# Patient Record
Sex: MALE | Race: BLACK OR AFRICAN AMERICAN | NOT HISPANIC OR LATINO | Employment: FULL TIME | URBAN - METROPOLITAN AREA
[De-identification: names, ages, dates, MRNs, and addresses within clinical notes are randomized per-mention and may not be internally consistent; named-entity substitution may affect disease eponyms.]

---

## 2023-10-04 DIAGNOSIS — R60.0 LOCALIZED EDEMA: Primary | ICD-10-CM

## 2023-10-04 RX ORDER — FUROSEMIDE 40 MG/1
40 TABLET ORAL DAILY
Qty: 30 TABLET | Refills: 0 | Status: SHIPPED | OUTPATIENT
Start: 2023-10-04 | End: 2023-11-13 | Stop reason: SDUPTHER

## 2023-10-04 RX ORDER — DULAGLUTIDE 3 MG/.5ML
3 INJECTION, SOLUTION SUBCUTANEOUS
COMMUNITY
Start: 2021-07-07 | End: 2023-11-01 | Stop reason: SDUPTHER

## 2023-10-04 RX ORDER — FUROSEMIDE 40 MG/1
40 TABLET ORAL DAILY
COMMUNITY
End: 2023-10-04 | Stop reason: SDUPTHER

## 2023-10-05 DIAGNOSIS — Z79.4 TYPE 2 DIABETES MELLITUS WITH HYPERGLYCEMIA, WITH LONG-TERM CURRENT USE OF INSULIN (MULTI): Primary | ICD-10-CM

## 2023-10-05 DIAGNOSIS — E11.65 TYPE 2 DIABETES MELLITUS WITH HYPERGLYCEMIA, WITH LONG-TERM CURRENT USE OF INSULIN (MULTI): Primary | ICD-10-CM

## 2023-10-05 RX ORDER — LANCETS 33 GAUGE
1 EACH MISCELLANEOUS 4 TIMES DAILY
COMMUNITY
Start: 2021-07-07 | End: 2023-11-01 | Stop reason: SDUPTHER

## 2023-10-05 RX ORDER — HUMAN INSULIN 100 [IU]/ML
30 INJECTION, SUSPENSION SUBCUTANEOUS
COMMUNITY
Start: 2021-07-07 | End: 2023-11-01 | Stop reason: ALTCHOICE

## 2023-10-05 RX ORDER — INSULIN PUMP SYRINGE, 3 ML
1 EACH MISCELLANEOUS 4 TIMES DAILY
COMMUNITY
Start: 2021-01-06 | End: 2023-11-01 | Stop reason: SDUPTHER

## 2023-10-05 RX ORDER — CALCIUM CITRATE/VITAMIN D3 200MG-6.25
1 TABLET ORAL 4 TIMES DAILY
COMMUNITY
Start: 2021-01-29 | End: 2023-11-01 | Stop reason: SDUPTHER

## 2023-10-31 PROBLEM — H52.4 MYOPIA OF BOTH EYES WITH REGULAR ASTIGMATISM AND PRESBYOPIA: Status: ACTIVE | Noted: 2023-10-31

## 2023-10-31 PROBLEM — E11.9 DIABETES MELLITUS, TYPE 2 (MULTI): Status: ACTIVE | Noted: 2023-10-31

## 2023-10-31 PROBLEM — H52.223 MYOPIA OF BOTH EYES WITH REGULAR ASTIGMATISM AND PRESBYOPIA: Status: ACTIVE | Noted: 2023-10-31

## 2023-10-31 PROBLEM — M25.819 SHOULDER IMPINGEMENT: Status: ACTIVE | Noted: 2023-10-31

## 2023-10-31 PROBLEM — R05.3 PERSISTENT COUGH: Status: ACTIVE | Noted: 2023-10-31

## 2023-10-31 PROBLEM — M75.82 TENDINITIS OF LEFT ROTATOR CUFF: Status: ACTIVE | Noted: 2023-10-31

## 2023-10-31 PROBLEM — U07.1 COVID-19 VIRUS INFECTION: Status: ACTIVE | Noted: 2023-10-31

## 2023-10-31 PROBLEM — H18.602: Status: ACTIVE | Noted: 2023-10-31

## 2023-10-31 PROBLEM — E11.3391: Status: ACTIVE | Noted: 2023-10-31

## 2023-10-31 PROBLEM — M72.0 DUPUYTRENS CONTRACTURE: Status: ACTIVE | Noted: 2023-10-31

## 2023-10-31 PROBLEM — M75.02 ADHESIVE CAPSULITIS OF LEFT SHOULDER: Status: ACTIVE | Noted: 2023-10-31

## 2023-10-31 PROBLEM — S43.439A LABRAL TEAR OF SHOULDER: Status: ACTIVE | Noted: 2023-10-31

## 2023-10-31 PROBLEM — H52.13 MYOPIA OF BOTH EYES WITH REGULAR ASTIGMATISM AND PRESBYOPIA: Status: ACTIVE | Noted: 2023-10-31

## 2023-10-31 PROBLEM — U07.1 COVID-19 VIRUS INFECTION: Status: RESOLVED | Noted: 2023-10-31 | Resolved: 2023-10-31

## 2023-10-31 PROBLEM — S46.212D: Status: ACTIVE | Noted: 2023-10-31

## 2023-10-31 PROBLEM — E11.3312: Status: ACTIVE | Noted: 2023-10-31

## 2023-10-31 PROBLEM — Z79.4 LONG TERM (CURRENT) USE OF INSULIN (MULTI): Status: ACTIVE | Noted: 2023-10-31

## 2023-10-31 PROBLEM — E66.01 MORBID OBESITY (MULTI): Status: ACTIVE | Noted: 2023-10-31

## 2023-10-31 PROBLEM — H52.13 MYOPIA OF BOTH EYES: Status: ACTIVE | Noted: 2023-10-31

## 2023-10-31 PROBLEM — R60.0 LOWER EXTREMITY EDEMA: Status: ACTIVE | Noted: 2023-10-31

## 2023-10-31 PROBLEM — I10 BENIGN ESSENTIAL HYPERTENSION: Status: ACTIVE | Noted: 2023-10-31

## 2023-10-31 PROBLEM — M25.512 LEFT SHOULDER PAIN: Status: ACTIVE | Noted: 2023-10-31

## 2023-10-31 PROBLEM — M75.81 RIGHT ROTATOR CUFF TENDINITIS: Status: ACTIVE | Noted: 2023-10-31

## 2023-10-31 RX ORDER — TIRZEPATIDE 10 MG/.5ML
10 INJECTION, SOLUTION SUBCUTANEOUS
COMMUNITY
End: 2023-11-01 | Stop reason: ALTCHOICE

## 2023-10-31 RX ORDER — ACETAMINOPHEN 500 MG
2 TABLET ORAL EVERY 6 HOURS PRN
COMMUNITY
Start: 2023-06-14

## 2023-10-31 RX ORDER — IBUPROFEN 600 MG/1
600 TABLET ORAL EVERY 6 HOURS PRN
COMMUNITY
Start: 2023-06-14 | End: 2023-11-01 | Stop reason: ALTCHOICE

## 2023-10-31 RX ORDER — LISINOPRIL 10 MG/1
10 TABLET ORAL DAILY
COMMUNITY
Start: 2023-01-04

## 2023-11-01 ENCOUNTER — OFFICE VISIT (OUTPATIENT)
Dept: ENDOCRINOLOGY | Facility: CLINIC | Age: 43
End: 2023-11-01
Payer: COMMERCIAL

## 2023-11-01 VITALS
BODY MASS INDEX: 45.1 KG/M2 | WEIGHT: 315 LBS | HEIGHT: 70 IN | DIASTOLIC BLOOD PRESSURE: 98 MMHG | HEART RATE: 90 BPM | SYSTOLIC BLOOD PRESSURE: 154 MMHG

## 2023-11-01 DIAGNOSIS — Z79.4 TYPE 2 DIABETES MELLITUS WITH HYPERGLYCEMIA, WITH LONG-TERM CURRENT USE OF INSULIN (MULTI): Primary | ICD-10-CM

## 2023-11-01 DIAGNOSIS — E11.65 TYPE 2 DIABETES MELLITUS WITH HYPERGLYCEMIA, WITH LONG-TERM CURRENT USE OF INSULIN (MULTI): Primary | ICD-10-CM

## 2023-11-01 DIAGNOSIS — E11.9 TYPE 2 DIABETES MELLITUS WITHOUT COMPLICATION, WITHOUT LONG-TERM CURRENT USE OF INSULIN (MULTI): ICD-10-CM

## 2023-11-01 LAB — POC HEMOGLOBIN A1C: 9.6 % (ref 4.2–6.5)

## 2023-11-01 PROCEDURE — 3077F SYST BP >= 140 MM HG: CPT | Performed by: PHYSICIAN ASSISTANT

## 2023-11-01 PROCEDURE — 99214 OFFICE O/P EST MOD 30 MIN: CPT | Performed by: PHYSICIAN ASSISTANT

## 2023-11-01 PROCEDURE — 3052F HG A1C>EQUAL 8.0%<EQUAL 9.0%: CPT | Performed by: PHYSICIAN ASSISTANT

## 2023-11-01 PROCEDURE — 4010F ACE/ARB THERAPY RXD/TAKEN: CPT | Performed by: PHYSICIAN ASSISTANT

## 2023-11-01 PROCEDURE — 83036 HEMOGLOBIN GLYCOSYLATED A1C: CPT | Performed by: PHYSICIAN ASSISTANT

## 2023-11-01 PROCEDURE — 3080F DIAST BP >= 90 MM HG: CPT | Performed by: PHYSICIAN ASSISTANT

## 2023-11-01 RX ORDER — INSULIN PUMP SYRINGE, 3 ML
1 EACH MISCELLANEOUS 4 TIMES DAILY
Qty: 1 EACH | Refills: 11 | Status: SHIPPED | OUTPATIENT
Start: 2023-11-01

## 2023-11-01 RX ORDER — CALCIUM CITRATE/VITAMIN D3 200MG-6.25
1 TABLET ORAL 4 TIMES DAILY
Qty: 400 EACH | Refills: 3 | Status: SHIPPED | OUTPATIENT
Start: 2023-11-01

## 2023-11-01 RX ORDER — ISOPROPYL ALCOHOL 70 ML/100ML
1 SWAB TOPICAL
Qty: 400 EACH | Refills: 3 | Status: SHIPPED | OUTPATIENT
Start: 2023-11-01 | End: 2024-10-31

## 2023-11-01 RX ORDER — LANCETS 33 GAUGE
1 EACH MISCELLANEOUS 4 TIMES DAILY
Qty: 30 EACH | Refills: 3 | Status: SHIPPED | OUTPATIENT
Start: 2023-11-01

## 2023-11-01 RX ORDER — DULAGLUTIDE 3 MG/.5ML
3 INJECTION, SOLUTION SUBCUTANEOUS
Qty: 2 ML | Refills: 11 | Status: SHIPPED | OUTPATIENT
Start: 2023-11-01

## 2023-11-01 NOTE — PROGRESS NOTES
"Subjective   Dallas Murray is a 43 y.o. male who presents for follow-up of Type 2 diabetes mellitus. The initial diagnosis of diabetes was made in January 2021 . The patient does have a known family history of diabetes.    Known complications due to diabetes included obesity    Cardiovascular risk factors include diabetes mellitus, hypertension, male gender, and obesity (BMI >= 30 kg/m2). The patient is on an ACE inhibitor or angiotensin II receptor blocker.  The patient has not been previously hospitalized due to diabetic ketoacidosis.     Current symptoms/problems include hyperglycemia. His clinical course has worsened as indicated by rise in A1C .     Current diabetes regimen is as follows:  invokamet (not taking) and trulicity 3mg      The patient is currently checking the blood glucose 2+ times per day.    Patient is using: glucometer    Hypoglycemia frequency: n/a  Hypoglycemia awareness: Yes     Exercise: rarely   Meal panning: He is using avoidance of concentrated sweets.    Review of Systems   Psychiatric/Behavioral:          Depressed mood, will give up on diet/medications when he is unable to get meds from pharmacy   All other systems reviewed and are negative.      Objective   BP (!) 154/98   Pulse 90   Ht 1.778 m (5' 10\")   Wt (!) 194 kg (428 lb)   BMI 61.41 kg/m²   Physical Exam  Constitutional:       Appearance: Normal appearance. He is obese.   HENT:      Head: Normocephalic.      Mouth/Throat:      Mouth: Mucous membranes are moist.      Pharynx: Oropharynx is clear.   Cardiovascular:      Rate and Rhythm: Normal rate and regular rhythm.   Pulmonary:      Effort: Pulmonary effort is normal.      Breath sounds: Normal breath sounds.   Musculoskeletal:         General: Swelling present.   Skin:     General: Skin is warm and dry.   Neurological:      General: No focal deficit present.      Mental Status: He is alert and oriented to person, place, and time. Mental status is at baseline. "   Psychiatric:         Mood and Affect: Mood normal.         Behavior: Behavior normal.         Thought Content: Thought content normal.         Judgment: Judgment normal.         Lab Review  Glucose (mg/dL)   Date Value   01/04/2023 177 (H)   07/07/2021 177 (H)   01/03/2021 363 (H)     POC HEMOGLOBIN A1c (%)   Date Value   11/01/2023 9.6 (A)     Hemoglobin A1C (%)   Date Value   01/04/2023 8.2 (A)   07/07/2021 7.8   01/02/2021 12.7     Bicarbonate (mmol/L)   Date Value   01/04/2023 28   07/07/2021 30   01/03/2021 24     Urea Nitrogen (mg/dL)   Date Value   01/04/2023 14   07/07/2021 17   01/03/2021 17     Creatinine (mg/dL)   Date Value   01/04/2023 0.97   07/07/2021 1.02   01/03/2021 0.73       Health Maintenance:   Foot Exam: podiatry seen in 6/2023  Eye Exam: scheduled to see optometry on 11/8/23  Lipid Panel: due for update, ordered today  Urine Albumin: due for update, ordered today    Assessment/Plan   Diagnoses and all orders for this visit:  Type 2 diabetes mellitus without complication, without long-term current use of insulin (CMS/[facility])  -     POCT glycosylated hemoglobin (Hb A1C) manually resulted  Type 2 diabetes mellitus with hyperglycemia, with long-term current use of insulin (CMS/[facility])  -     empagliflozin-metformin (Synjardy) 12.5-1,000 mg; Take 1 tablet by mouth 2 times a day.  -     dulaglutide (Trulicity) 3 mg/0.5 mL pen injector; Inject 3 mg under the skin 1 (one) time per week.      Type 2 diabetes mellitus, is not at goal.    RX changes:  restart Trulicity 3mg once a week, switch invokamet to synjardy 12.5-1000mg twice a day with food,     Education:  interpretation of lab results, blood sugar goals, and nutrition  Follow up: I recommend diabetes care be 3 months.

## 2023-11-08 ENCOUNTER — APPOINTMENT (OUTPATIENT)
Dept: OPHTHALMOLOGY | Facility: CLINIC | Age: 43
End: 2023-11-08
Payer: COMMERCIAL

## 2023-11-13 ENCOUNTER — TELEPHONE (OUTPATIENT)
Dept: ENDOCRINOLOGY | Facility: HOSPITAL | Age: 43
End: 2023-11-13
Payer: COMMERCIAL

## 2023-11-13 DIAGNOSIS — R60.0 LOCALIZED EDEMA: ICD-10-CM

## 2023-11-14 RX ORDER — FUROSEMIDE 40 MG/1
40 TABLET ORAL DAILY
Qty: 30 TABLET | Refills: 0 | Status: SHIPPED | OUTPATIENT
Start: 2023-11-14

## 2024-02-14 ENCOUNTER — APPOINTMENT (OUTPATIENT)
Dept: OPHTHALMOLOGY | Facility: CLINIC | Age: 44
End: 2024-02-14
Payer: COMMERCIAL

## 2024-05-15 ENCOUNTER — APPOINTMENT (OUTPATIENT)
Dept: OPHTHALMOLOGY | Facility: CLINIC | Age: 44
End: 2024-05-15
Payer: COMMERCIAL

## 2024-06-12 ENCOUNTER — APPOINTMENT (OUTPATIENT)
Dept: OPHTHALMOLOGY | Facility: CLINIC | Age: 44
End: 2024-06-12
Payer: COMMERCIAL

## 2024-09-24 ENCOUNTER — APPOINTMENT (OUTPATIENT)
Dept: ENDOCRINOLOGY | Facility: CLINIC | Age: 44
End: 2024-09-24
Payer: COMMERCIAL

## 2024-09-24 ENCOUNTER — OFFICE VISIT (OUTPATIENT)
Dept: ENDOCRINOLOGY | Facility: CLINIC | Age: 44
End: 2024-09-24
Payer: COMMERCIAL

## 2024-09-24 VITALS
HEIGHT: 70 IN | HEART RATE: 87 BPM | SYSTOLIC BLOOD PRESSURE: 149 MMHG | TEMPERATURE: 97.2 F | WEIGHT: 232.3 LBS | BODY MASS INDEX: 33.26 KG/M2 | DIASTOLIC BLOOD PRESSURE: 86 MMHG

## 2024-09-24 DIAGNOSIS — E11.9 TYPE 2 DIABETES MELLITUS WITHOUT COMPLICATION, WITHOUT LONG-TERM CURRENT USE OF INSULIN (MULTI): ICD-10-CM

## 2024-09-24 DIAGNOSIS — Z79.4 TYPE 2 DIABETES MELLITUS WITH HYPERGLYCEMIA, WITH LONG-TERM CURRENT USE OF INSULIN: ICD-10-CM

## 2024-09-24 DIAGNOSIS — E11.65 TYPE 2 DIABETES MELLITUS WITH HYPERGLYCEMIA, WITH LONG-TERM CURRENT USE OF INSULIN: ICD-10-CM

## 2024-09-24 LAB — POC HEMOGLOBIN A1C: 12.4 % (ref 4.2–6.5)

## 2024-09-24 PROCEDURE — 4010F ACE/ARB THERAPY RXD/TAKEN: CPT | Performed by: STUDENT IN AN ORGANIZED HEALTH CARE EDUCATION/TRAINING PROGRAM

## 2024-09-24 PROCEDURE — 99215 OFFICE O/P EST HI 40 MIN: CPT | Performed by: STUDENT IN AN ORGANIZED HEALTH CARE EDUCATION/TRAINING PROGRAM

## 2024-09-24 PROCEDURE — 83036 HEMOGLOBIN GLYCOSYLATED A1C: CPT | Performed by: STUDENT IN AN ORGANIZED HEALTH CARE EDUCATION/TRAINING PROGRAM

## 2024-09-24 PROCEDURE — 3077F SYST BP >= 140 MM HG: CPT | Performed by: STUDENT IN AN ORGANIZED HEALTH CARE EDUCATION/TRAINING PROGRAM

## 2024-09-24 PROCEDURE — 1036F TOBACCO NON-USER: CPT | Performed by: STUDENT IN AN ORGANIZED HEALTH CARE EDUCATION/TRAINING PROGRAM

## 2024-09-24 PROCEDURE — 3008F BODY MASS INDEX DOCD: CPT | Performed by: STUDENT IN AN ORGANIZED HEALTH CARE EDUCATION/TRAINING PROGRAM

## 2024-09-24 PROCEDURE — 3079F DIAST BP 80-89 MM HG: CPT | Performed by: STUDENT IN AN ORGANIZED HEALTH CARE EDUCATION/TRAINING PROGRAM

## 2024-09-24 RX ORDER — INSULIN GLARGINE 300 [IU]/ML
INJECTION, SOLUTION SUBCUTANEOUS
Qty: 3 ML | Refills: 5 | Status: SHIPPED | OUTPATIENT
Start: 2024-09-24

## 2024-09-24 RX ORDER — LANCETS 33 GAUGE
EACH MISCELLANEOUS
Qty: 200 EACH | Refills: 3 | Status: SHIPPED | OUTPATIENT
Start: 2024-09-24

## 2024-09-24 RX ORDER — PEN NEEDLE, DIABETIC 30 GX3/16"
NEEDLE, DISPOSABLE MISCELLANEOUS
Qty: 100 EACH | Refills: 3 | Status: SHIPPED | OUTPATIENT
Start: 2024-09-24

## 2024-09-24 RX ORDER — ISOPROPYL ALCOHOL 70 ML/100ML
SWAB TOPICAL
Qty: 400 EACH | Refills: 3 | Status: SHIPPED | OUTPATIENT
Start: 2024-09-24

## 2024-09-24 RX ORDER — CYCLOBENZAPRINE HCL 10 MG
TABLET ORAL
COMMUNITY
Start: 2023-10-17

## 2024-09-24 RX ORDER — SEMAGLUTIDE 1.34 MG/ML
1 INJECTION, SOLUTION SUBCUTANEOUS
Qty: 3 ML | Refills: 0 | Status: SHIPPED | OUTPATIENT
Start: 2024-09-29

## 2024-09-24 RX ORDER — SEMAGLUTIDE 2.68 MG/ML
2 INJECTION, SOLUTION SUBCUTANEOUS
Qty: 3 ML | Refills: 5 | Status: SHIPPED | OUTPATIENT
Start: 2024-09-24

## 2024-09-24 RX ORDER — CALCIUM CITRATE/VITAMIN D3 200MG-6.25
1 TABLET ORAL 4 TIMES DAILY
Qty: 400 EACH | Refills: 3 | Status: SHIPPED | OUTPATIENT
Start: 2024-09-24

## 2024-09-24 RX ORDER — SEMAGLUTIDE 0.68 MG/ML
0.5 INJECTION, SOLUTION SUBCUTANEOUS
Qty: 3 ML | Refills: 0 | Status: SHIPPED | OUTPATIENT
Start: 2024-09-24

## 2024-09-24 NOTE — PATIENT INSTRUCTIONS
Start Toujeo 20 units daily at bedtime  Start Ozempic 0.5 mg, once a week.   After 4 weeks, increase to 1 mg, once a week  After another 4 weeks, increase to 2 mg, once a week  Continue Synjardy  Check blood glucose twice a day (fasting and 2 hours after a meal)

## 2024-09-24 NOTE — PROGRESS NOTES
FUV for diabetes. LV with Tonia Adams PA-C 11/2023.    Subjective   Dallas Murray is a 43 y.o. male with a hx of type 2 diabetes, HTN, HLD, obesity, who presents for management of diabetes.    Dx: 01/2021  HbA1c: 12.4% (9/24/2024), 9.6% (11/2023)  Current regimen: (Synjardy 12.5-1000 mg BID, Trulicity 3 mg/wee)  Past medications: invokamet, Lantus (skin reaction)  Complications:    SMBG: no    Hypoglycemia awareness:    Diet: mostly twice a day   Breakfast and lunch    Social Hx: works in [employer]; goes to work very early in the morning (3:30 am) and finishes between 2:30-4 pm.    ROS  General: no fever or chills  CV: no chest pain   Respiratory: no shortness of breath  MSK: no lower extremity edema  Neuro: no headache or dizziness  See HPI for Endocrine ROS    No past medical history on file.    No past surgical history on file.    Social History     Socioeconomic History    Marital status: Single     Spouse name: Not on file    Number of children: Not on file    Years of education: Not on file    Highest education level: Not on file   Occupational History    Not on file   Tobacco Use    Smoking status: Never     Passive exposure: Past    Smokeless tobacco: Never   Substance and Sexual Activity    Alcohol use: Not on file    Drug use: Not on file    Sexual activity: Not on file   Other Topics Concern    Not on file   Social History Narrative    Not on file     Social Determinants of Health     Financial Resource Strain: Not on File (8/25/2019)    Received from [facility]    Financial Resource Strain     Financial Resource Strain: 0   Food Insecurity: Not on File (8/25/2019)    Received from [facility]    Food Insecurity     Food: 0   Transportation Needs: Not on File (8/25/2019)    Received from [facility]    Transportation Needs     Transportation: 0   Physical Activity: Not on File (8/25/2019)    Received from [facility]    Physical Activity     Physical Activity: 0   Stress: Not on File  "(2019)    Received from [facility]    Stress     Stress: 0   Social Connections: Not on File (2019)    Received from [facility]    Social Connections     Social Connections and Isolation: 0   Intimate Partner Violence: Not on file   Housing Stability: Not on File (2019)    Received from [facility]    Housing Stability     Housin       Objective    Physical Exam  Blood pressure 149/86, pulse 87, temperature 36.2 °C (97.2 °F), temperature source Temporal, height 1.778 m (5' 10\"), weight 105 kg (232 lb 4.8 oz).  General: not in acute distress  HEENT: KOREY, EOMI  Thyroid: no goiter  Neuro: alert and oriented x 3      Current Outpatient Medications:     acetaminophen (Tylenol) 500 mg tablet, Take 2 tablets (1,000 mg) by mouth every 6 hours if needed (FOR UP TO 14 DAYS AS NEEDED FOR PAIN)., Disp: , Rfl:     alcohol swabs (Alcohol Prep Pads) pads, medicated, Apply 1 each topically in the morning, at noon, in the evening, and at bedtime. USE AS DIRECTED 4 times daily and PRN, Disp: 400 each, Rfl: 3    cyclobenzaprine (Flexeril) 10 mg tablet, TAKE 1 TABLET BY MOUTH EVERY 8 HOURS FOR UP TO 5 DAYS AS NEEDED, Disp: , Rfl:     empagliflozin-metformin (Synjardy) 12.5-1,000 mg, Take 1 tablet by mouth 2 times a day., Disp: 180 tablet, Rfl: 3    furosemide (Lasix) 40 mg tablet, Take 1 tablet (40 mg) by mouth once daily. as directed, Disp: 30 tablet, Rfl: 0    lancets 33 gauge misc, Inject 1 each under the skin 4 times a day., Disp: 30 each, Rfl: 3    lisinopril 10 mg tablet, Take 1 tablet (10 mg) by mouth once daily., Disp: , Rfl:     blood sugar diagnostic (True Metrix Glucose Test Strip) strip, Inject 1 each under the skin 4 times a day., Disp: 400 each, Rfl: 3    dulaglutide (Trulicity) 3 mg/0.5 mL pen injector, Inject 3 mg under the skin 1 (one) time per week. (Patient not taking: Reported on 2024), Disp: 2 mL, Rfl: 11    FreeStyle glucose monitoring (Blood Glucose Monitoring) kit, Inject 1 each " under the skin 4 times a day., Disp: 1 each, Rfl: 11    Assessment/Plan   Dallas Murray is a 43 y.o. male with a hx of type 2 diabetes, HTN, obesity, who presents for management of diabetes.    Type 2 diabetes mellitus  HbA1c: 12.4% (9/24/2024), 9.6% (11/2023), 8.2% (01/2023)  Current regimen: (Synjardy 12.5-1000 mg BID, Trulicity 3 mg/week)  Eye exam: due  Urine microalbumin: 302 micrograms/mg (01/2023) on ACE-I and SGLT2i  Podiatry: due  Lipids: HDL 43, , TG 88 (01/2023)    A1c: 12.4% today  Has been off of all diabetes medications since losing insurance.  Has not been checking blood sugars.  He previously had local skin irritation with long-acting insulin (Lantus and Tresiba).  Given the significantly elevated A1c, I told him he needs insulin at this time to break the glucotoxicity.   I recommend starting long-acting insulin, Ozempic, and resuming Synjardy.  Advised that he check BG twice a day.  He is reluctant to take insulin, but he is agreeable to trying.  Will titrate up Ozempic every 4 weeks to the 2 mg dose.  Will refer to clinical pharmacy for help with medication adjustment.    Did not have time to discuss screening or CGM today.  Will discuss at next visit.    PLAN:  -start Toujeo 20 units at bedtime  -start Ozempic 0.5 mg/week, increase every 4 weeks to 2 mg/week  -resume Synjardy 12.5-1000 mg BID  -check BG twice daily (fasting and 2h post meal)  -to discuss screening for complications at next visit  -refer to clinical pharmacy for medication adjustment    Follow-up in 4 months

## 2024-10-07 DIAGNOSIS — Z79.4 TYPE 2 DIABETES MELLITUS WITH HYPERGLYCEMIA, WITH LONG-TERM CURRENT USE OF INSULIN: ICD-10-CM

## 2024-10-07 DIAGNOSIS — E11.65 TYPE 2 DIABETES MELLITUS WITH HYPERGLYCEMIA, WITH LONG-TERM CURRENT USE OF INSULIN: ICD-10-CM

## 2024-10-14 RX ORDER — INSULIN GLARGINE 100 [IU]/ML
INJECTION, SOLUTION SUBCUTANEOUS
Qty: 15 ML | Refills: 5 | Status: SHIPPED | OUTPATIENT
Start: 2024-10-14 | End: 2024-10-18 | Stop reason: SDUPTHER

## 2024-10-14 RX ORDER — PEN NEEDLE, DIABETIC 30 GX3/16"
NEEDLE, DISPOSABLE MISCELLANEOUS
Qty: 100 EACH | Refills: 3 | Status: SHIPPED | OUTPATIENT
Start: 2024-10-14 | End: 2024-10-18 | Stop reason: SDUPTHER

## 2024-10-14 RX ORDER — LANCETS 33 GAUGE
EACH MISCELLANEOUS
Qty: 200 EACH | Refills: 3 | Status: SHIPPED | OUTPATIENT
Start: 2024-10-14

## 2024-10-18 ENCOUNTER — APPOINTMENT (OUTPATIENT)
Dept: ENDOCRINOLOGY | Facility: CLINIC | Age: 44
End: 2024-10-18
Payer: COMMERCIAL

## 2024-10-18 DIAGNOSIS — Z79.4 TYPE 2 DIABETES MELLITUS WITH HYPERGLYCEMIA, WITH LONG-TERM CURRENT USE OF INSULIN: ICD-10-CM

## 2024-10-18 DIAGNOSIS — E11.65 TYPE 2 DIABETES MELLITUS WITH HYPERGLYCEMIA, WITH LONG-TERM CURRENT USE OF INSULIN: ICD-10-CM

## 2024-10-18 RX ORDER — CALCIUM CITRATE/VITAMIN D3 200MG-6.25
1 TABLET ORAL 4 TIMES DAILY
Qty: 400 EACH | Refills: 3 | Status: SHIPPED | OUTPATIENT
Start: 2024-10-18

## 2024-10-18 RX ORDER — SEMAGLUTIDE 1.34 MG/ML
1 INJECTION, SOLUTION SUBCUTANEOUS
Qty: 3 ML | Refills: 0 | Status: SHIPPED | OUTPATIENT
Start: 2024-10-20

## 2024-10-18 RX ORDER — SEMAGLUTIDE 2.68 MG/ML
2 INJECTION, SOLUTION SUBCUTANEOUS
Qty: 3 ML | Refills: 5 | Status: SHIPPED | OUTPATIENT
Start: 2024-10-20

## 2024-10-18 RX ORDER — PEN NEEDLE, DIABETIC 30 GX3/16"
NEEDLE, DISPOSABLE MISCELLANEOUS
Qty: 100 EACH | Refills: 3 | Status: SHIPPED | OUTPATIENT
Start: 2024-10-18

## 2024-10-18 RX ORDER — INSULIN GLARGINE 100 [IU]/ML
INJECTION, SOLUTION SUBCUTANEOUS
Qty: 15 ML | Refills: 5 | Status: SHIPPED | OUTPATIENT
Start: 2024-10-18

## 2024-10-31 DIAGNOSIS — E11.65 TYPE 2 DIABETES MELLITUS WITH HYPERGLYCEMIA, WITH LONG-TERM CURRENT USE OF INSULIN: Primary | ICD-10-CM

## 2024-10-31 DIAGNOSIS — Z79.4 TYPE 2 DIABETES MELLITUS WITH HYPERGLYCEMIA, WITH LONG-TERM CURRENT USE OF INSULIN: Primary | ICD-10-CM

## 2024-10-31 RX ORDER — DULAGLUTIDE 4.5 MG/.5ML
4.5 INJECTION, SOLUTION SUBCUTANEOUS
Qty: 2 ML | Refills: 5 | Status: SHIPPED | OUTPATIENT
Start: 2024-11-03

## 2024-11-05 DIAGNOSIS — E11.69 TYPE 2 DIABETES MELLITUS WITH OTHER SPECIFIED COMPLICATION, UNSPECIFIED WHETHER LONG TERM INSULIN USE (MULTI): Primary | ICD-10-CM

## 2025-02-05 ENCOUNTER — APPOINTMENT (OUTPATIENT)
Dept: ENDOCRINOLOGY | Facility: CLINIC | Age: 45
End: 2025-02-05
Payer: COMMERCIAL